# Patient Record
Sex: FEMALE | Race: WHITE | NOT HISPANIC OR LATINO | URBAN - METROPOLITAN AREA
[De-identification: names, ages, dates, MRNs, and addresses within clinical notes are randomized per-mention and may not be internally consistent; named-entity substitution may affect disease eponyms.]

---

## 2024-02-09 ENCOUNTER — OV NP (OUTPATIENT)
Dept: URBAN - METROPOLITAN AREA CLINIC 50 | Facility: CLINIC | Age: 29
End: 2024-02-09
Payer: COMMERCIAL

## 2024-02-09 ENCOUNTER — LAB (OUTPATIENT)
Dept: URBAN - METROPOLITAN AREA CLINIC 50 | Facility: CLINIC | Age: 29
End: 2024-02-09

## 2024-02-09 VITALS
HEIGHT: 63 IN | SYSTOLIC BLOOD PRESSURE: 99 MMHG | TEMPERATURE: 98.8 F | DIASTOLIC BLOOD PRESSURE: 72 MMHG | BODY MASS INDEX: 24.8 KG/M2 | WEIGHT: 140 LBS | HEART RATE: 91 BPM

## 2024-02-09 DIAGNOSIS — R11.0 NAUSEA: ICD-10-CM

## 2024-02-09 DIAGNOSIS — R10.13 EPIGASTRIC PAIN: ICD-10-CM

## 2024-02-09 DIAGNOSIS — R07.89 ATYPICAL CHEST PAIN: ICD-10-CM

## 2024-02-09 DIAGNOSIS — R13.19 OTHER DYSPHAGIA: ICD-10-CM

## 2024-02-09 PROBLEM — 79922009: Status: ACTIVE | Noted: 2024-02-09

## 2024-02-09 PROBLEM — 40739000: Status: ACTIVE | Noted: 2024-02-09

## 2024-02-09 PROBLEM — 422587007: Status: ACTIVE | Noted: 2024-02-09

## 2024-02-09 PROBLEM — 102589003: Status: ACTIVE | Noted: 2024-02-09

## 2024-02-09 PROBLEM — 64109004: Status: ACTIVE | Noted: 2024-02-09

## 2024-02-09 PROCEDURE — 99204 OFFICE O/P NEW MOD 45 MIN: CPT | Performed by: PHYSICIAN ASSISTANT

## 2024-02-09 PROCEDURE — 99244 OFF/OP CNSLTJ NEW/EST MOD 40: CPT | Performed by: PHYSICIAN ASSISTANT

## 2024-02-09 RX ORDER — SUCRALFATE 1 G/1
1 TABLET ON AN EMPTY STOMACH TABLET ORAL TWICE A DAY
Qty: 60 | Refills: 1 | OUTPATIENT
Start: 2024-02-09 | End: 2024-04-09

## 2024-02-09 RX ORDER — FAMOTIDINE 20 MG/1
1 TABLET TABLET, FILM COATED ORAL TWICE DAILY
Qty: 60 | Refills: 1 | OUTPATIENT
Start: 2024-02-09

## 2024-02-09 NOTE — PHYSICAL EXAM CHEST:
chest wall exquisitely tender, breathing is unlabored without accessory muscle use, normal breath sounds

## 2024-02-09 NOTE — PHYSICAL EXAM GASTROINTESTINAL
Abdomen , soft, epigastric tenderness, nondistended , no guarding or rigidity , no masses palpable , normal bowel sounds , Liver and Spleen,  no hepatosplenomegaly , liver nontender

## 2024-02-09 NOTE — HPI-TODAY'S VISIT:
Patient is 27y/o F of DR. Keller at [facility] ([facility]) here for discussion on abd pains States 4 weeks ago had rib pains, didn't improve, 2 weeks later seen at [facility], workup unremarkable at that time A week later, seen at [facility] for same issue, chest pains Was recommended for outpatient GI workup/endoscopy Describes chest pains as constant pains, worse with movement Admits abdominal pains, distention/swelling in stomach Admits nausea, no vomiting, denies indigestion/heartburn, did have reflux about a month ago but improved w OTC measures, no routine symptoms States did have a few episodes of vomiting, stomach bug prior to onset of symptoms -- unsure if related Admits dysphagia, feels throat tight/feels it gong down, pain with swallow Not losing wt, but appetite low BMs regular/unchanged, no melena/blood/mucus, BMs daily ER said may had costochondritis as well, started on lidocaine patches to help Also started on keflex by ER -- states was concerend for infection and covered for this, not specified what infection Had an EGD about 11 years ago -- was told had gastritis at that time, but no persistent issue after that season Presented to ER at [facility] 1/25/24 w L sided rib pain x 5 days. CTA neg for PE, CBC w WBC 12.8, CMP, bHcg unremarkable, discharged w suspected costochondritis and hepatic cyst. Subsequently presented to [facility] 1/31/24 w LUQ abdominal pain, CBC, CMP, lipase, UA remarkable for WBC 12.08 at that time. CTA chest, CT A/P revealing No definite pulmonary embolus. No acute findings within the abdomen or pelvis. No acute cardiopulmonary abnormality. Small fat-containing umbilical hernia. Left hepatic lobe subcapsular cyst anteriorly. Was recommended discharge w pantoprazole. Outpatient GI follow up to consider endoscopy recommended per ER provider No heart/lung/kideny disease, can do stairs, not on blood thinner